# Patient Record
Sex: MALE | Race: WHITE | NOT HISPANIC OR LATINO | Employment: UNEMPLOYED | URBAN - NONMETROPOLITAN AREA
[De-identification: names, ages, dates, MRNs, and addresses within clinical notes are randomized per-mention and may not be internally consistent; named-entity substitution may affect disease eponyms.]

---

## 2024-07-27 ENCOUNTER — HOSPITAL ENCOUNTER (EMERGENCY)
Facility: HOSPITAL | Age: 44
Discharge: HOME | End: 2024-07-27
Payer: COMMERCIAL

## 2024-07-27 VITALS
WEIGHT: 140 LBS | HEART RATE: 73 BPM | OXYGEN SATURATION: 98 % | BODY MASS INDEX: 21.22 KG/M2 | HEIGHT: 68 IN | TEMPERATURE: 97.4 F | SYSTOLIC BLOOD PRESSURE: 108 MMHG | RESPIRATION RATE: 18 BRPM | DIASTOLIC BLOOD PRESSURE: 91 MMHG

## 2024-07-27 DIAGNOSIS — Z13.9 ENCOUNTER FOR MEDICAL SCREENING EXAMINATION: Primary | ICD-10-CM

## 2024-07-27 PROCEDURE — 99281 EMR DPT VST MAYX REQ PHY/QHP: CPT

## 2024-07-27 ASSESSMENT — COLUMBIA-SUICIDE SEVERITY RATING SCALE - C-SSRS
1. IN THE PAST MONTH, HAVE YOU WISHED YOU WERE DEAD OR WISHED YOU COULD GO TO SLEEP AND NOT WAKE UP?: NO
2. HAVE YOU ACTUALLY HAD ANY THOUGHTS OF KILLING YOURSELF?: NO
6. HAVE YOU EVER DONE ANYTHING, STARTED TO DO ANYTHING, OR PREPARED TO DO ANYTHING TO END YOUR LIFE?: NO

## 2024-07-27 ASSESSMENT — PAIN SCALES - GENERAL: PAINLEVEL_OUTOF10: 0 - NO PAIN

## 2024-07-27 ASSESSMENT — PAIN - FUNCTIONAL ASSESSMENT: PAIN_FUNCTIONAL_ASSESSMENT: 0-10

## 2024-07-28 NOTE — ED PROVIDER NOTES
HPI   Chief Complaint   Patient presents with    Anxiety     Pt is anxious and depressed because his ex wife is refusing to let him see his kids.  States his wife is making him get a mental evaluation with [facility] before letting him see his kids.   Pt denies suicidal homicidal ideations.   Denies hallucinations.       Patient presents to the emergency department requesting evaluation from [facility].  Patient states he has been excommunicated from the [facility] and he would like to go see his kids but unfortunately he was told that he needed further evaluation before the [facility] would allow him to see his children.  Patient denies any suicidal or homicidal ideation.  No hallucinations.  Patient denies any alcohol or drug use.  Patient states that this is been a long difficult journey for the past 3 years as he left the [facility] and some type of [facility] camp this out in the woods.  Patient states he did not agree with some of the things that were happening and so he left.  But he left his wife and 9 children.    Patient denies chest pain or shortness of breath.  Denies any nausea or vomiting.  Appetite remains intact.  Patient denies headache or vision trouble.  No trouble urinating.      History provided by:  Patient          Patient History   History reviewed. No pertinent past medical history.  History reviewed. No pertinent surgical history.  No family history on file.  Social History     Tobacco Use    Smoking status: Not on file    Smokeless tobacco: Not on file   Substance Use Topics    Alcohol use: Not on file    Drug use: Not on file       Physical Exam   ED Triage Vitals [07/27/24 2015]   Temperature Heart Rate Respirations BP   36.3 °C (97.4 °F) 73 18 (!) 108/91      Pulse Ox Temp src Heart Rate Source Patient Position   98 % -- -- --      BP Location FiO2 (%)     -- --       Physical Exam  Vitals and nursing note reviewed.   Constitutional:       General: He is not in acute  distress.     Appearance: Normal appearance. He is normal weight. He is not ill-appearing or toxic-appearing.   HENT:      Head: Normocephalic.      Right Ear: External ear normal.      Left Ear: External ear normal.      Nose: Nose normal.      Mouth/Throat:      Lips: Pink. No lesions.      Mouth: Mucous membranes are moist.   Eyes:      General: No scleral icterus.     Conjunctiva/sclera: Conjunctivae normal.   Cardiovascular:      Rate and Rhythm: Normal rate and regular rhythm.      Heart sounds: Normal heart sounds.   Pulmonary:      Effort: Pulmonary effort is normal.      Breath sounds: Normal breath sounds and air entry.   Musculoskeletal:      Right lower leg: No edema.      Left lower leg: No edema.   Skin:     General: Skin is warm.      Capillary Refill: Capillary refill takes less than 2 seconds.   Neurological:      General: No focal deficit present.      Mental Status: He is alert and oriented to person, place, and time.      Cranial Nerves: No cranial nerve deficit or facial asymmetry.      Sensory: No sensory deficit.      Motor: No weakness.      Gait: Gait normal.   Psychiatric:         Attention and Perception: Attention and perception normal.         Mood and Affect: Mood and affect normal.         Speech: Speech normal.         Behavior: Behavior normal. Behavior is cooperative.         Thought Content: Thought content normal.         Cognition and Memory: Cognition and memory normal.         Judgment: Judgment normal.           ED Course & MDM   Diagnoses as of 07/27/24 2038   Encounter for medical screening examination                       [place] Coma Scale Score: 15                        Medical Decision Making  Patient presents to the emergency department requesting evaluation from [facility].  Patient states he has been excommunicated from the [facility] and he would like to go see his kids but unfortunately he was told that he needed further evaluation before the [facility]  would allow him to see his children.  Patient denies any suicidal or homicidal ideation.  No hallucinations.  Patient denies any alcohol or drug use.  Patient states that this is been a long difficult journey for the past 3 years as he left the [facility] and some type of [facility] camp this out in the woods.  Patient states he did not agree with some of the things that were happening and so he left.  But he left his wife and 9 children.    Patient denies chest pain or shortness of breath.  Denies any nausea or vomiting.  Appetite remains intact.  Patient denies headache or vision trouble.  No trouble urinating.    Ddx: Medical screening exam    At this point patient has no physical concerns.  Is not suicidal or homicidal.  No emergent or concerning psychiatric emergency.  I did offer to contact harry fernandez if he would like with a medical screening exam but patient declined.  I did have a lengthy discussion with the patient and encouraged him to seek out job and family services to give direction on how to get his family back.  Also discussed potentially getting an  if this could help.  I did give patient a list of primary care providers care of his physical needs.  Also gave him harry fernandez's name and phone number for any psychiatric needs.  Patient does state that he has an appointment in August 5 with [facility] to discuss his concerns.  I encouraged him to keep this appointment.  Patient encouraged to return with any worsening symptoms or concerns.  Patient discharged home in improved stable condition        Procedure  Procedures     Gwendolyn Singleton PA-C  07/27/24 2033